# Patient Record
Sex: MALE | Race: BLACK OR AFRICAN AMERICAN | NOT HISPANIC OR LATINO | URBAN - METROPOLITAN AREA
[De-identification: names, ages, dates, MRNs, and addresses within clinical notes are randomized per-mention and may not be internally consistent; named-entity substitution may affect disease eponyms.]

---

## 2017-03-22 ENCOUNTER — EMERGENCY (EMERGENCY)
Facility: HOSPITAL | Age: 32
LOS: 1 days | Discharge: HOME | End: 2017-03-22
Attending: EMERGENCY MEDICINE | Admitting: EMERGENCY MEDICINE
Payer: MEDICAID

## 2017-03-22 VITALS
TEMPERATURE: 99 F | DIASTOLIC BLOOD PRESSURE: 63 MMHG | SYSTOLIC BLOOD PRESSURE: 107 MMHG | HEART RATE: 80 BPM | RESPIRATION RATE: 16 BRPM | OXYGEN SATURATION: 100 %

## 2017-03-22 DIAGNOSIS — B20 HUMAN IMMUNODEFICIENCY VIRUS [HIV] DISEASE: ICD-10-CM

## 2017-03-22 DIAGNOSIS — Z11.3 ENCOUNTER FOR SCREENING FOR INFECTIONS WITH A PREDOMINANTLY SEXUAL MODE OF TRANSMISSION: ICD-10-CM

## 2017-03-22 DIAGNOSIS — Z79.1 LONG TERM (CURRENT) USE OF NON-STEROIDAL ANTI-INFLAMMATORIES (NSAID): ICD-10-CM

## 2017-03-22 DIAGNOSIS — Z79.2 LONG TERM (CURRENT) USE OF ANTIBIOTICS: ICD-10-CM

## 2017-03-22 PROCEDURE — 99284 EMERGENCY DEPT VISIT MOD MDM: CPT

## 2017-03-22 RX ORDER — CEFTRIAXONE 500 MG/1
250 INJECTION, POWDER, FOR SOLUTION INTRAMUSCULAR; INTRAVENOUS ONCE
Qty: 0 | Refills: 0 | Status: COMPLETED | OUTPATIENT
Start: 2017-03-22 | End: 2017-03-22

## 2017-03-22 RX ORDER — AZITHROMYCIN 500 MG/1
1000 TABLET, FILM COATED ORAL ONCE
Qty: 0 | Refills: 0 | Status: COMPLETED | OUTPATIENT
Start: 2017-03-22 | End: 2017-03-22

## 2017-03-22 RX ORDER — PENICILLIN G BENZATHINE 1200000 [IU]/2ML
2.4 INJECTION, SUSPENSION INTRAMUSCULAR ONCE
Qty: 0 | Refills: 0 | Status: COMPLETED | OUTPATIENT
Start: 2017-03-22 | End: 2017-03-22

## 2017-03-22 RX ADMIN — PENICILLIN G BENZATHINE 2.4 MILLION UNIT(S): 1200000 INJECTION, SUSPENSION INTRAMUSCULAR at 14:15

## 2017-03-22 RX ADMIN — CEFTRIAXONE 250 MILLIGRAM(S): 500 INJECTION, POWDER, FOR SOLUTION INTRAMUSCULAR; INTRAVENOUS at 14:10

## 2017-03-22 RX ADMIN — AZITHROMYCIN 1000 MILLIGRAM(S): 500 TABLET, FILM COATED ORAL at 14:32

## 2017-03-22 NOTE — ED PROVIDER NOTE - OBJECTIVE STATEMENT
30 y/o M with h/o HIV (unknown CD4 and VL) presenting for STI treatment with c/o dysuria. Pt's partner is symptomatic for both urethritis and syphilis. No f/c, discharge, rash. 32 y/o M with h/o HIV (unknown CD4, but reports VL as undetectable, on HAART) presenting for STI treatment with c/o dysuria. Pt's partner is symptomatic for both urethritis and syphilis. No f/c, discharge, rash.

## 2017-03-22 NOTE — ED PROVIDER NOTE - MEDICAL DECISION MAKING DETAILS
Given exposure and risk will treat for both GCC and syphilis.  Gave strict return precautions and instructed to f/u with his HIV doctor at [facility].

## 2017-03-22 NOTE — ED PROVIDER NOTE - NS ED MD SCRIBE ATTENDING SCRIBE SECTIONS
PAST MEDICAL/SURGICAL/SOCIAL HISTORY/HIV/REVIEW OF SYSTEMS/HISTORY OF PRESENT ILLNESS/PHYSICAL EXAM/VITAL SIGNS( Pullset)

## 2017-03-23 NOTE — ED POST DISCHARGE NOTE - OTHER COMMUNICATION
+ gonorrhea - treated in the ED.  discussed findings with patient on 3/23 at 530pm + gonorrhea, syphilis- treated in the ED.  discussed findings with patient on 3/23 at 530pm.  Has been treated for syphilis in the past

## 2018-03-08 ENCOUNTER — EMERGENCY (EMERGENCY)
Facility: HOSPITAL | Age: 33
LOS: 1 days | Discharge: HOME | End: 2018-03-08
Admitting: EMERGENCY MEDICINE
Payer: MEDICAID

## 2018-03-08 VITALS
DIASTOLIC BLOOD PRESSURE: 78 MMHG | OXYGEN SATURATION: 100 % | TEMPERATURE: 99 F | RESPIRATION RATE: 16 BRPM | SYSTOLIC BLOOD PRESSURE: 149 MMHG | HEART RATE: 84 BPM

## 2018-03-08 DIAGNOSIS — F17.200 NICOTINE DEPENDENCE, UNSPECIFIED, UNCOMPLICATED: ICD-10-CM

## 2018-03-08 DIAGNOSIS — Z79.2 LONG TERM (CURRENT) USE OF ANTIBIOTICS: ICD-10-CM

## 2018-03-08 DIAGNOSIS — Z20.2 CONTACT WITH AND (SUSPECTED) EXPOSURE TO INFECTIONS WITH A PREDOMINANTLY SEXUAL MODE OF TRANSMISSION: ICD-10-CM

## 2018-03-08 DIAGNOSIS — B20 HUMAN IMMUNODEFICIENCY VIRUS [HIV] DISEASE: ICD-10-CM

## 2018-03-08 DIAGNOSIS — R30.0 DYSURIA: ICD-10-CM

## 2018-03-08 DIAGNOSIS — Z79.899 OTHER LONG TERM (CURRENT) DRUG THERAPY: ICD-10-CM

## 2018-03-08 DIAGNOSIS — R36.9 URETHRAL DISCHARGE, UNSPECIFIED: ICD-10-CM

## 2018-03-08 DIAGNOSIS — R21 RASH AND OTHER NONSPECIFIC SKIN ERUPTION: ICD-10-CM

## 2018-03-08 LAB
APPEARANCE UR: CLEAR — SIGNIFICANT CHANGE UP
BILIRUB UR-MCNC: NEGATIVE — SIGNIFICANT CHANGE UP
COLOR SPEC: YELLOW — SIGNIFICANT CHANGE UP
DIFF PNL FLD: NEGATIVE — SIGNIFICANT CHANGE UP
GLUCOSE UR QL: NEGATIVE — SIGNIFICANT CHANGE UP
KETONES UR-MCNC: NEGATIVE — SIGNIFICANT CHANGE UP
LEUKOCYTE ESTERASE UR-ACNC: NEGATIVE — SIGNIFICANT CHANGE UP
NITRITE UR-MCNC: NEGATIVE — SIGNIFICANT CHANGE UP
PH UR: 6 — SIGNIFICANT CHANGE UP (ref 5–8)
PROT UR-MCNC: (no result) MG/DL
SP GR SPEC: >=1.03 — SIGNIFICANT CHANGE UP (ref 1–1.03)
UROBILINOGEN FLD QL: 0.2 E.U./DL — SIGNIFICANT CHANGE UP

## 2018-03-08 PROCEDURE — 99284 EMERGENCY DEPT VISIT MOD MDM: CPT

## 2018-03-08 RX ORDER — IBUPROFEN 200 MG
1 TABLET ORAL
Qty: 20 | Refills: 0 | OUTPATIENT
Start: 2018-03-08

## 2018-03-08 RX ORDER — AZITHROMYCIN 500 MG/1
1000 TABLET, FILM COATED ORAL ONCE
Qty: 0 | Refills: 0 | Status: COMPLETED | OUTPATIENT
Start: 2018-03-08 | End: 2018-03-08

## 2018-03-08 RX ORDER — CEFTRIAXONE 500 MG/1
250 INJECTION, POWDER, FOR SOLUTION INTRAMUSCULAR; INTRAVENOUS ONCE
Qty: 0 | Refills: 0 | Status: COMPLETED | OUTPATIENT
Start: 2018-03-08 | End: 2018-03-08

## 2018-03-08 RX ADMIN — AZITHROMYCIN 1000 MILLIGRAM(S): 500 TABLET, FILM COATED ORAL at 19:26

## 2018-03-08 RX ADMIN — CEFTRIAXONE 250 MILLIGRAM(S): 500 INJECTION, POWDER, FOR SOLUTION INTRAMUSCULAR; INTRAVENOUS at 19:25

## 2018-03-08 RX ADMIN — Medication 100 MILLIGRAM(S): at 19:26

## 2018-03-08 NOTE — ED PROVIDER NOTE - PHYSICAL EXAMINATION
Gen - WDWN, NAD, comfortable and non-toxic appearing  Skin - warm, dry, intact   CV - S1S2, R/R/R  Resp - CTAB, no r/r/w   GI - NABS, soft, ND, NT, no rebound or guarding, no CVAT b/l    - external genitalia wnl, no testicular or scrotal edema, +painful ecchymotic rash to the dorsum aspect of the distal shaft and slight d/c from volar aspect of the glan, no erythema, crepitus, fluctuance or tenderness, no penile d/c, bleeding, or ulceration noted, cremasteric reflex intact b/l   MS - w/w/p, no c/c/e, no CVAT b/l  Neuro - AxOx3, ambulatory without gait disturbance

## 2018-03-08 NOTE — ED PROVIDER NOTE - MEDICAL DECISION MAKING DETAILS
pt with 3d of penile d/c and painless rash to the dorsum aspect of the distal penile shaft, noted multiple sexual partners, empirically treated for GC/CG/syphilis in the ED, labs and urine sent, d/c'd home to f/u with clinic, counseling provided, strict return precautions discussed, prompt return to ER for any worsening or new sx, pt verbalized understanding.

## 2018-03-09 LAB
C TRACH RRNA SPEC QL NAA+PROBE: SIGNIFICANT CHANGE UP
N GONORRHOEA RRNA SPEC QL NAA+PROBE: SIGNIFICANT CHANGE UP
SPECIMEN SOURCE: SIGNIFICANT CHANGE UP
T PALLIDUM AB TITR SER: POSITIVE

## 2019-01-15 NOTE — ED PROVIDER NOTE - EYES, MLM
Problem: PHYSICAL THERAPY ADULT  Goal: Performs mobility at highest level of function for planned discharge setting  See evaluation for individualized goals  Treatment/Interventions: Functional transfer training, LE strengthening/ROM, Elevations, Therapeutic exercise, Endurance training, Patient/family training, Equipment eval/education, Bed mobility, Gait training, Compensatory technique education, Continued evaluation, Spoke to nursing, Family  Equipment Recommended: Liang Anna (possible RW)       See flowsheet documentation for full assessment, interventions and recommendations  Outcome: Progressing  Prognosis: Good  Problem List: Decreased strength, Decreased endurance, Impaired balance, Decreased mobility, Pain, Decreased safety awareness  Assessment: Pt is 71 y/o male admitted with severe sepsis, bacteremia, UTI with epididymitis, urinary retention, NSTEMI type 2  PT consulted  Activity as tolerated  Prior to admission was completely independent in multistory home  Using cane only for community distances  Denies falls and reports shares household chores with brother  Currently presents with decreased general strength, activity tolerance, balance and mobility  Requires Emy for bed mobility, transfers and short distance ambulation with RW support  Fatigues with short distance and declines trial of further ambulation 2* calf tightness with WB activity  O2 sats on 3L 94% with short distances  Applied soft care cushion to chair for improved comfort  Will continue to benefit from ongoing PT in order to assure ability to achieve independent function for safe d/c to home  Monitor disposition with progress  Rhb v home with PT  May need RW  Will follow  Barriers to Discharge: Inaccessible home environment  Barriers to Discharge Comments: 2 story home  Recommendation: Home with family support, Home PT, Short-term skilled PT (pending progress, pt goal is home  )     PT - OK to Discharge: No (to acheive mobiltiy goals for safe d/c to home )    See flowsheet documentation for full assessment  Clear bilaterally, pupils equal, round and reactive to light.

## 2020-12-21 ENCOUNTER — EMERGENCY (EMERGENCY)
Facility: HOSPITAL | Age: 35
LOS: 1 days | Discharge: HOME | End: 2020-12-21
Attending: EMERGENCY MEDICINE | Admitting: EMERGENCY MEDICINE
Payer: COMMERCIAL

## 2020-12-21 VITALS
HEIGHT: 71 IN | OXYGEN SATURATION: 99 % | TEMPERATURE: 99 F | WEIGHT: 139.99 LBS | RESPIRATION RATE: 18 BRPM | DIASTOLIC BLOOD PRESSURE: 76 MMHG | HEART RATE: 96 BPM | SYSTOLIC BLOOD PRESSURE: 143 MMHG

## 2020-12-21 DIAGNOSIS — N48.89 OTHER SPECIFIED DISORDERS OF PENIS: ICD-10-CM

## 2020-12-21 DIAGNOSIS — A64 UNSPECIFIED SEXUALLY TRANSMITTED DISEASE: ICD-10-CM

## 2020-12-21 PROBLEM — A74.9 CHLAMYDIAL INFECTION, UNSPECIFIED: Chronic | Status: ACTIVE | Noted: 2018-03-08

## 2020-12-21 PROBLEM — A54.9 GONOCOCCAL INFECTION, UNSPECIFIED: Chronic | Status: ACTIVE | Noted: 2018-03-08

## 2020-12-21 PROBLEM — A53.9 SYPHILIS, UNSPECIFIED: Chronic | Status: ACTIVE | Noted: 2018-03-08

## 2020-12-21 PROCEDURE — 99284 EMERGENCY DEPT VISIT MOD MDM: CPT

## 2020-12-21 RX ORDER — PENICILLIN G BENZATHINE 1200000 [IU]/2ML
2.4 INJECTION, SUSPENSION INTRAMUSCULAR ONCE
Refills: 0 | Status: COMPLETED | OUTPATIENT
Start: 2020-12-21 | End: 2020-12-21

## 2020-12-21 RX ORDER — AZITHROMYCIN 500 MG/1
1000 TABLET, FILM COATED ORAL ONCE
Refills: 0 | Status: DISCONTINUED | OUTPATIENT
Start: 2020-12-21 | End: 2020-12-21

## 2020-12-21 RX ORDER — CEFTRIAXONE 500 MG/1
500 INJECTION, POWDER, FOR SOLUTION INTRAMUSCULAR; INTRAVENOUS ONCE
Refills: 0 | Status: COMPLETED | OUTPATIENT
Start: 2020-12-21 | End: 2020-12-21

## 2020-12-21 RX ORDER — CEFTRIAXONE 500 MG/1
250 INJECTION, POWDER, FOR SOLUTION INTRAMUSCULAR; INTRAVENOUS ONCE
Refills: 0 | Status: DISCONTINUED | OUTPATIENT
Start: 2020-12-21 | End: 2020-12-21

## 2020-12-21 RX ADMIN — CEFTRIAXONE 500 MILLIGRAM(S): 500 INJECTION, POWDER, FOR SOLUTION INTRAMUSCULAR; INTRAVENOUS at 15:29

## 2020-12-21 RX ADMIN — Medication 100 MILLIGRAM(S): at 15:29

## 2020-12-21 NOTE — ED PROVIDER NOTE - CLINICAL SUMMARY MEDICAL DECISION MAKING FREE TEXT BOX
Patient requesting STD/STI after developing a penile sore s/p unprotected sexual intercourse with a male partner 1 week ago. Will give prophylactic treatment and obtain STI screening labs. Anticipate discharge.

## 2020-12-21 NOTE — ED PROVIDER NOTE - NSFOLLOWUPINSTRUCTIONS_ED_ALL_ED_FT
Sexually Transmitted Diseases    WHAT YOU NEED TO KNOW:    A sexually transmitted disease (STD) means signs or symptoms of a sexually transmitted infection (STI) have developed. An STI happens when bacteria or a virus are spread through oral, genital, or anal sex. Some examples of STDs are HIV, chlamydia, syphilis, and gonorrhea.    DISCHARGE INSTRUCTIONS:    Return to the emergency department if:   •You have genital swelling or pain, or unusual bleeding.      •You have joint pain, a rash, swollen lymph nodes, or night sweats.      •You have severe abdominal pain.      Call your doctor if:   •You have a fever.      •Your symptoms do not go away or get worse, even after treatment.      •You have bleeding or pain during sex.      •You or your female partner may be pregnant.      •You have questions or concerns about your condition or care.      Medicines: You may need any of the following:   •Antibiotics may be given for a bacterial infection.      •Antivirals may be given for a viral infection.      •Antifungals may be given for a fungal infection, such as a yeast infection.      •Take your medicine as directed. Contact your healthcare provider if you think your medicine is not helping or if you have side effects. Tell him of her if you are allergic to any medicine. Keep a list of the medicines, vitamins, and herbs you take. Include the amounts, and when and why you take them. Bring the list or the pill bottles to follow-up visits. Carry your medicine list with you in case of an emergency.      Help prevent the spread of an STI: Ask your healthcare provider for more information about the following safe sex practices:  •Use a male or female condom during sex. This includes oral, genital, or anal sex. Use a new condom each time. Condoms help prevent pregnancy and STIs. Use latex condoms, if possible. Lambskin (also called sheepskin or natural membrane) condoms do not protect against STIs. A polyurethane condom can be used if you or your partner is allergic to latex. Condoms should be used with a second form of birth control to help prevent pregnancy and STIs. Do not use male and female condoms together.      •Do not have sex with someone who has an STI or STD. This includes oral and anal sex.      •Limit sex partners. Ask about your partner's sex history before you have sex.      •Get screened regularly if you are sexually active. Common tests include chlamydia, gonorrhea, HIV, hepatitis, and syphilis.      •Tell your sex partners if you have an STI. Your partners may need to be tested and treated. Do not have sex while you are being treated for an STI. Do not have sex with a partner who is being treated.      •Ask about medicines to lower your risk for some STIs: ?Vaccines can help protect you from hepatitis A, hepatitis B, and the human papillomavirus (HPV). The HPV vaccine is usually given at 11 years, but it may be given through 26 years to both females and males. Your provider can give you more information on vaccines to prevent STIs.      ?Pre-exposure prophylaxis (PrEP) may be given if you are at high risk for HIV. PrEP is taken every day to prevent the virus from fully infecting the body.      •If you are a woman, do not douche. Douching upsets the normal balance of bacteria found in your vagina. It does not prevent or clear up vaginal infections.      Follow up with your doctor as directed: You may need more tests. If you have an STD, you may need immediate or ongoing treatment. Your doctor may also refer you to a specialist who can provide specific treatment. Write down your questions so you remember to ask them during your visits.

## 2020-12-21 NOTE — ED PROVIDER NOTE - OBJECTIVE STATEMENT
34 y/o male with PMHx of HIV+ (on antiretrovirals, unknown CD4 count) presents to the ED after having unprotected intercourse 1 week ago with another male. Patient developed a sore to the left side of his penis and states it is similar to when he had syphilis 2 years ago. Sore is nonpainful. Denies fever, chills, abdominal pain, N/V, dysuria, hematuria, or penile discharge.

## 2020-12-21 NOTE — ED PROVIDER NOTE - CPE EDP ENMT NORM
normal... Scribe Attestation (For Scribes USE Only)... Scribe Attestation (For Scribes USE Only).../Attending Attestation (For Attendings USE Only)...

## 2020-12-21 NOTE — ED ADULT NURSE NOTE - CHIEF COMPLAINT
The patient is a 35y Male complaining of std/sti check. requesting STD check. reports developing a sore to penis 1 week ago, has gotten better. denies dysuria.

## 2020-12-21 NOTE — ED ADULT TRIAGE NOTE - CHIEF COMPLAINT QUOTE
requesting STD check. reports developing a sore to penis 1 week ago, has gotten better. denies dysuria.

## 2020-12-23 LAB
CULTURE RESULTS: SIGNIFICANT CHANGE UP
SPECIMEN SOURCE: SIGNIFICANT CHANGE UP

## 2021-02-24 NOTE — ED PROVIDER NOTE - CROS ED CARDIOVAS ALL NEG
----- Message from Brent Degroot MD sent at 2/22/2021  2:46 PM EST -----  Please call patient to and tell him his test was normal.   Thanks.  
Called patient and was able to let him know per Dr. Mukherjee that his echo had came back normal. Patient was understanding and also has an appointment today with Dr. Mukherjee.   
negative...

## 2024-04-26 NOTE — ED PROVIDER NOTE - PATIENT PORTAL LINK FT
"Group Topic: Goals   Group Date: 4/26/2024  Start Time: 0730  End Time: 0800  Facilitators: DESIRE Turner   Department: [facility] REHAB THERAPY VIRTUAL    Number of Participants: 5   Group Focus: check in, daily focus, and goals  Treatment Modality: Other: Recreational Therapy   Interventions utilized were exploration and support  Purpose: other: daily goals, gratitude, positive intentions     Name: Mesfin Paiz YOB: 1978   MR: [id number]      Facilitator: Recreational Therapist  Level of Participation: did not attend  Progress: None  Comments: Patients were provided with the \"Daily Check In\" handout for morning goal group. The handout includes 4 main sections - (current mood, gratitude, goals, and intentions for the day).    Patient declined invitation to group activity at this time. Patient will continue to be provided with opportunities to enhance leisure skills and/or coping mechanisms.    Plan: continue with services      " You can access the FollowMyHealth Patient Portal offered by [facility] by registering at the following website: [email or web address]. By joining [facility]’s FollowMyHealth portal, you will also be able to view your health information using other applications (apps) compatible with our system.

## 2024-10-03 NOTE — ED PROVIDER NOTE - NEUROLOGICAL, MLM
DASI Score:7.44  DASI Activity: Pt exercise 1hr of cardio daily, bicycle or tread-mill, able to go up one flight of stairs or walk 1-2 blocks with out difficulty  Loose or removable teeth: denies .                                 Alert and oriented, no focal deficits, no motor or sensory deficits.
